# Patient Record
Sex: FEMALE | Race: ASIAN | NOT HISPANIC OR LATINO | Employment: FULL TIME | URBAN - METROPOLITAN AREA
[De-identification: names, ages, dates, MRNs, and addresses within clinical notes are randomized per-mention and may not be internally consistent; named-entity substitution may affect disease eponyms.]

---

## 2023-10-02 ENCOUNTER — OFFICE VISIT (OUTPATIENT)
Dept: PRIMARY CARE | Facility: CLINIC | Age: 53
End: 2023-10-02
Payer: COMMERCIAL

## 2023-10-02 VITALS
WEIGHT: 181 LBS | SYSTOLIC BLOOD PRESSURE: 130 MMHG | HEART RATE: 76 BPM | RESPIRATION RATE: 14 BRPM | DIASTOLIC BLOOD PRESSURE: 76 MMHG | BODY MASS INDEX: 28.41 KG/M2 | HEIGHT: 67 IN

## 2023-10-02 DIAGNOSIS — Z12.11 ENCOUNTER FOR SCREENING FOR MALIGNANT NEOPLASM OF COLON: ICD-10-CM

## 2023-10-02 DIAGNOSIS — D21.9 FIBROIDS: ICD-10-CM

## 2023-10-02 DIAGNOSIS — Z12.31 BREAST CANCER SCREENING BY MAMMOGRAM: ICD-10-CM

## 2023-10-02 DIAGNOSIS — Z00.00 ROUTINE MEDICAL EXAM: Primary | ICD-10-CM

## 2023-10-02 PROCEDURE — 99396 PREV VISIT EST AGE 40-64: CPT | Performed by: FAMILY MEDICINE

## 2023-10-02 PROCEDURE — 1036F TOBACCO NON-USER: CPT | Performed by: FAMILY MEDICINE

## 2023-10-02 RX ORDER — IBUPROFEN 600 MG/1
TABLET ORAL 4 TIMES DAILY
COMMUNITY
Start: 2021-12-06 | End: 2024-01-29 | Stop reason: WASHOUT

## 2023-10-02 NOTE — PROGRESS NOTES
Rt knee pain and swelling.  pt has regular dental visits. no vision problems. no hearing loss.   Lifestyle: healthy diet. no weight concerns. Pt exercises regularly.   no tobacco or alcohol abuse.   Safety elements used: seat belt, safe driving habits and smoke detector.   no passive smoke exposure, chemical abuse, domestic violence, anxiety symptoms, depression symptoms, pt has safe sexual behavior and safe driving habits, no driving violations, no history of DUI. no tuberculosis exposure.   The patient is postmenopausal. No vaginal bleeding. no menopausal symptoms. no bladder concerns.  no history of an abnormal pap smear.      Review of Systems  Constitutional: no chills, no fever and no night sweats.   Eyes: no blurred vision and no eyesight problems.   ENT: no hearing loss, no nasal congestion, no nasal discharge, no hoarseness and no sore throat.   Neck: no mass(es) and no swelling.   Cardiovascular: no chest pain, no intermittent leg claudication, no lower extremity edema, no palpitations and no syncope.   Respiratory: no cough, no shortness of breath during exertion, no shortness of breath at rest and no wheezing.   Gastrointestinal: no abdominal pain, no blood in stools, no constipation, no diarrhea, no melena, no nausea, no rectal pain and no vomiting.   Genitourinary: no unexplained vaginal bleeding, no dysuria, no change in urinary frequency, no genital lesions, no hematuria, no urinary hesitancy, no incontinence, no pelvic pain, no sexual dysfunction, no feelings of urinary urgency and no vaginal discharge.   Musculoskeletal: no arthralgias, no back pain, no localized joint pain, no myalgias and no neck pain.   Integumentary: no new skin lesions, no rashes and no skin wound.   Neurological: no confusion, no convulsions, no difficulty walking, no dizziness, no headache, no limb weakness, no memory changes, no numbness, no speech difficulties and no tingling.   Psychiatric: no anxiety, no personality  change, no depression, no anhedonia, no sleep disturbances and no substance use disorders.   Endocrine: no changes in appetite, no deepening of the voice, no polyuria, no feelings of weakness, no recent weight gain and no recent weight loss.   Hematologic/Lymphatic: no tendency for easy bleeding, no tendency for easy bruising, no recurrent infections and no swollen glands.     Physical Exam  Constitutional: Alert and in no acute distress. Well developed, well nourished.   Head and Face: Head and face: Normal.  Palpation of the face and sinuses: Normal.    Eyes: Normal external exam. Pupils: PERRLA,  EOMI.    Ears, Nose, Mouth, and Throat: External inspection of ears and nose: Normal.  Hearing: Normal.  Nasal mucosa, septum, and turbinates: Normal.  Oropharynx: Normal.    Neck: No neck mass was observed. Supple. Thyroid not enlarged and there were no palpable thyroid nodules.   Cardiovascular: Heart rate and rhythm were normal, normal S1 and S2, no gallops, no murmurs and no pericardial rub. Pedal pulses: Normal. No peripheral edema.   Pulmonary: No respiratory distress. Clear bilateral breath sounds.   Chest wall: Normal.    Abdomen: Soft nontender; no abdominal mass palpated. No organomegaly. No hernias.   Musculoskeletal: Gait and station: Normal. No joint swelling seen, normal movements of all extremities. Range of motion: Normal.  Muscle strength/tone: Normal.    Skin: Normal skin color and pigmentation, normal skin turgor, and no rash. Palpation of skin and subcutaneous tissue: Normal.    Neurologic: Cranial nerves 2-12 grossly intact. Deep tendon reflexes were 2+ and symmetric. Sensation: Normal. Coordination: Normal.    Psychiatric: Judgment and insight: Intact. Alert and oriented x 3. Recent and remote memory: Normal.  Mood and affect: Normal.   Lymphatic: No cervical lymphadenopathy. Palpation of lymph nodes in axillae: Normal.      Unremarkable PE except overweight. recommend nutritionist eval. Recommend  DASH diet and regular exercise. check CBC, CMP, lipid, TSH.  Advise eye exam by an OD yearly and dental exam every 6 months. will monitor lipid and weight yearly. Recommend sunscreen application if exposed to the sun when UV index is above 2.  recommend Hep C, HepB, HIV test. recommend   shingle, hepB, flu, covid shot.   Check  cologuard.   We will call pt regarding lab results. f/u as scheduled.  Recommend mammogram and pap smear. Pt prefers to see her Gynecologist for evaluation.

## 2023-10-04 ENCOUNTER — LAB (OUTPATIENT)
Dept: LAB | Facility: LAB | Age: 53
End: 2023-10-04
Payer: COMMERCIAL

## 2023-10-04 DIAGNOSIS — Z00.00 ROUTINE MEDICAL EXAM: ICD-10-CM

## 2023-10-04 LAB
ALBUMIN SERPL BCP-MCNC: 4.2 G/DL (ref 3.4–5)
ALP SERPL-CCNC: 58 U/L (ref 33–110)
ALT SERPL W P-5'-P-CCNC: 11 U/L (ref 7–45)
ANION GAP SERPL CALC-SCNC: 9 MMOL/L (ref 10–20)
AST SERPL W P-5'-P-CCNC: 20 U/L (ref 9–39)
BILIRUB SERPL-MCNC: 0.6 MG/DL (ref 0–1.2)
BUN SERPL-MCNC: 12 MG/DL (ref 6–23)
CALCIUM SERPL-MCNC: 9.5 MG/DL (ref 8.6–10.3)
CHLORIDE SERPL-SCNC: 108 MMOL/L (ref 98–107)
CHOLEST SERPL-MCNC: 199 MG/DL (ref 0–199)
CHOLESTEROL/HDL RATIO: 3.2
CO2 SERPL-SCNC: 26 MMOL/L (ref 21–32)
CREAT SERPL-MCNC: 0.78 MG/DL (ref 0.5–1.05)
ERYTHROCYTE [DISTWIDTH] IN BLOOD BY AUTOMATED COUNT: 12.1 % (ref 11.5–14.5)
GFR SERPL CREATININE-BSD FRML MDRD: >90 ML/MIN/1.73M*2
GLUCOSE SERPL-MCNC: 91 MG/DL (ref 74–99)
HCT VFR BLD AUTO: 41.2 % (ref 36–46)
HDLC SERPL-MCNC: 61.6 MG/DL
HGB BLD-MCNC: 13.1 G/DL (ref 12–16)
LDLC SERPL CALC-MCNC: 120 MG/DL (ref 140–190)
MCH RBC QN AUTO: 29.9 PG (ref 26–34)
MCHC RBC AUTO-ENTMCNC: 31.8 G/DL (ref 32–36)
MCV RBC AUTO: 94 FL (ref 80–100)
NON HDL CHOLESTEROL: 137 MG/DL (ref 0–149)
NRBC BLD-RTO: 0 /100 WBCS (ref 0–0)
PLATELET # BLD AUTO: 283 X10*3/UL (ref 150–450)
PMV BLD AUTO: 11 FL (ref 7.5–11.5)
POTASSIUM SERPL-SCNC: 4 MMOL/L (ref 3.5–5.3)
PROT SERPL-MCNC: 6.9 G/DL (ref 6.4–8.2)
RBC # BLD AUTO: 4.38 X10*6/UL (ref 4–5.2)
SODIUM SERPL-SCNC: 139 MMOL/L (ref 136–145)
TRIGL SERPL-MCNC: 85 MG/DL (ref 0–149)
TSH SERPL-ACNC: 3.44 MIU/L (ref 0.44–3.98)
VIT B12 SERPL-MCNC: 501 PG/ML (ref 211–911)
VLDL: 17 MG/DL (ref 0–40)
WBC # BLD AUTO: 4.9 X10*3/UL (ref 4.4–11.3)

## 2023-10-04 PROCEDURE — 36415 COLL VENOUS BLD VENIPUNCTURE: CPT

## 2023-10-06 ENCOUNTER — APPOINTMENT (OUTPATIENT)
Dept: RADIOLOGY | Facility: CLINIC | Age: 53
End: 2023-10-06
Payer: COMMERCIAL

## 2023-10-20 ENCOUNTER — APPOINTMENT (OUTPATIENT)
Dept: RADIOLOGY | Facility: CLINIC | Age: 53
End: 2023-10-20
Payer: COMMERCIAL

## 2023-10-20 ENCOUNTER — HOSPITAL ENCOUNTER (OUTPATIENT)
Dept: RADIOLOGY | Facility: HOSPITAL | Age: 53
Discharge: HOME | End: 2023-10-20
Payer: COMMERCIAL

## 2023-10-20 DIAGNOSIS — Z12.31 BREAST CANCER SCREENING BY MAMMOGRAM: ICD-10-CM

## 2023-10-20 PROCEDURE — 77063 BREAST TOMOSYNTHESIS BI: CPT | Mod: BILATERAL PROCEDURE | Performed by: RADIOLOGY

## 2023-10-20 PROCEDURE — 77067 SCR MAMMO BI INCL CAD: CPT

## 2023-10-20 PROCEDURE — 77067 SCR MAMMO BI INCL CAD: CPT | Mod: BILATERAL PROCEDURE | Performed by: RADIOLOGY

## 2023-10-20 PROCEDURE — 77063 BREAST TOMOSYNTHESIS BI: CPT | Mod: 50

## 2023-11-07 ENCOUNTER — OFFICE VISIT (OUTPATIENT)
Dept: PRIMARY CARE | Facility: CLINIC | Age: 53
End: 2023-11-07
Payer: COMMERCIAL

## 2023-11-07 VITALS — HEART RATE: 76 BPM | SYSTOLIC BLOOD PRESSURE: 95 MMHG | DIASTOLIC BLOOD PRESSURE: 60 MMHG | RESPIRATION RATE: 14 BRPM

## 2023-11-07 DIAGNOSIS — R55 PRE-SYNCOPE: Primary | ICD-10-CM

## 2023-11-07 PROCEDURE — 99213 OFFICE O/P EST LOW 20 MIN: CPT | Performed by: FAMILY MEDICINE

## 2023-11-07 PROCEDURE — 1036F TOBACCO NON-USER: CPT | Performed by: FAMILY MEDICINE

## 2023-11-07 NOTE — PROGRESS NOTES
Subjective   Patient ID: Starla Celaya is a 53 y.o. female who presents for almost passed out last Saturday    HPI   Pt almost passed out 3 days ago at home. No blurry vision,  CP, HA, dizziness, heart palpitation. No claudication or cold LE.  No LE edema. No incontinence, tongue biting or memory loss. No body shaking. Good mood. symptoms has not recurred since 3 days ago    Review of Systems    Objective   BP 95/60   Pulse 76   Resp 14     Physical Exam  NAD, well groomed, No sclera icterus. neck: supple, no cervical or axillary lymphadenopathy,  lungs: CTA b/l, heart: RRR, No LE edema, normal pedal pulses, abd: soft, no tenderness, BS+, normal strength and sensation  at bilateral lower extremities. Fair  balance. CNII-XII were grossly intact, good judgment and memory. No depressed mood.    Assessment/Plan     Pre syncope. Concerning of neuro and cardio etiology. Pt prefers to see a cardiologist for eval, previous neuro eval was unremarkable. Call or go to ER asp if symptoms recur

## 2023-12-05 ENCOUNTER — APPOINTMENT (OUTPATIENT)
Dept: CARDIOLOGY | Facility: CLINIC | Age: 53
End: 2023-12-05
Payer: COMMERCIAL

## 2024-01-29 ENCOUNTER — CONSULT (OUTPATIENT)
Dept: OBSTETRICS AND GYNECOLOGY | Facility: CLINIC | Age: 54
End: 2024-01-29
Payer: COMMERCIAL

## 2024-01-29 VITALS
BODY MASS INDEX: 27.63 KG/M2 | DIASTOLIC BLOOD PRESSURE: 74 MMHG | HEART RATE: 83 BPM | SYSTOLIC BLOOD PRESSURE: 134 MMHG | WEIGHT: 176.4 LBS

## 2024-01-29 DIAGNOSIS — Z12.31 BREAST CANCER SCREENING BY MAMMOGRAM: ICD-10-CM

## 2024-01-29 DIAGNOSIS — D25.1 INTRAMURAL AND SUBMUCOUS LEIOMYOMA OF UTERUS: ICD-10-CM

## 2024-01-29 DIAGNOSIS — D25.0 INTRAMURAL AND SUBMUCOUS LEIOMYOMA OF UTERUS: ICD-10-CM

## 2024-01-29 DIAGNOSIS — D21.9 FIBROIDS: Primary | ICD-10-CM

## 2024-01-29 PROCEDURE — 99204 OFFICE O/P NEW MOD 45 MIN: CPT | Performed by: OBSTETRICS & GYNECOLOGY

## 2024-01-29 PROCEDURE — 99214 OFFICE O/P EST MOD 30 MIN: CPT | Mod: TH | Performed by: OBSTETRICS & GYNECOLOGY

## 2024-01-29 ASSESSMENT — ENCOUNTER SYMPTOMS
RESPIRATORY NEGATIVE: 0
HEMATOLOGIC/LYMPHATIC NEGATIVE: 0
NEUROLOGICAL NEGATIVE: 0
MUSCULOSKELETAL NEGATIVE: 0
CARDIOVASCULAR NEGATIVE: 0
ALLERGIC/IMMUNOLOGIC NEGATIVE: 0
EYES NEGATIVE: 0
PSYCHIATRIC NEGATIVE: 0
CONSTITUTIONAL NEGATIVE: 0
ENDOCRINE NEGATIVE: 0
GASTROINTESTINAL NEGATIVE: 0

## 2024-01-29 ASSESSMENT — PAIN SCALES - GENERAL: PAINLEVEL: 0-NO PAIN

## 2024-01-29 NOTE — PROGRESS NOTES
"Assessment   IMPRESSIONS:  Known fibroid uterus with improving sx since HIFU in June 2023 in [facility].  Recommend repeat imaging for surveillance.  At this time, no concerning sx warranting hysterectomy.  Will give pt follow up plan following ultrasound.  Anticipate 6 month FU.    Aziza Rasmussen MD    Subjective   Cheryl Celaya \"Starla\" is a 53 y.o. female here for eval and mgmt of uterine fibroids. Has been seeing Dr. Rios.    Last note Oct 2022:  H/o multiple uterine fibroids, with enlarging uterus, at 11.5 cm now as compared to 9 cm on previous ultrasound.   Complex left ovarian cyst still persists, appears to be stable with normal further enlargement.    US Sept 2022:  \"Uterine position is anteverted, anteflexed. The uterus measures 11.5  x 10.9 x 7.2 cm. Multiple uterine masses are present, somewhat  difficult to define, consistent with fibroids. A posterior uterine  body partially subserosal partially intramural fibroid mass is 4.6 x  3.6 x 5.4 cm, previously measured in a slightly different manner,  approximate size on prior study 3.5 x 4.2 by 3.8 cm. A lower uterine  segment hypoechoic solid mass measuring 6.2 x 7.1 x 5.4 cm was  measured in a different manner on the prior study, difficult to  compare size although subjectively not significantly changed.  Anterior fundal fibroid measured on the prior study is not measured  on the current exam although is proximally 6.9 cm greatest diameter  measured on cine clip, similar to previous. The endometrium is poorly seen due to fibroids.\"    US Aug 2023 (in [facility]): Uterus 11.1 x 8 x 9 cm. Uterine fibroids reduced in size.    PATH Oct 2022: benign polyp    SX:  Pt reports she had HIFU in June 2023 in [facility], which shrunk down the fibroids a little bit. Due for repeat imaging.  Sx are minimal, occ pelvic discomfort, but no real bulk sx.  Pt has had decreasing bleeding over time, last any bleeding was Nov/Dec 2023, but bleed prior to that was in June 2023, and " prior to that was July 2022.   In terms of perimenopause, her VSM has been improving over the past year.  Last Hgb 13 in Oct 2023.    OBHx: G0    GynHx:  Menses: in late perimenopause  Pap: 2021 - neg cotest  STIs: denies  Sexual Activity: men, monogamous, no complaints  Contraception: None    Past med hx and past surg hx reviewed and notable for: orthostatic syncope    Objective   /74   Pulse 83   Wt 80 kg (176 lb 6.4 oz)   BMI 27.63 kg/m²      General:   Alert and oriented, in no acute distress   Neck: Supple. No visible thyromegaly.    Breast/Axilla: Normal to palpation bilaterally without masses, skin changes, or nipple discharge.    Abdomen: Soft, non-tender, enlarged uterus palpable below fundus on abd exam   Vulva: Normal architecture without erythema, masses, or lesions.    Vagina: Normal mucosa without lesions, masses, or atrophy. No abnormal vaginal discharge.    Cervix: Normal without masses, lesions, or signs of cervicitis.    Uterus: Non-mobile, bulky uterus.     Adnexa: Normal without masses or lesions   Pelvic Floor No POP noted. No high tone pelvic floor    Psych Normal affect. Normal mood.

## 2024-02-12 ENCOUNTER — HOSPITAL ENCOUNTER (OUTPATIENT)
Dept: RADIOLOGY | Facility: CLINIC | Age: 54
Discharge: HOME | End: 2024-02-12
Payer: COMMERCIAL

## 2024-02-12 DIAGNOSIS — D25.1 INTRAMURAL AND SUBMUCOUS LEIOMYOMA OF UTERUS: ICD-10-CM

## 2024-02-12 DIAGNOSIS — D25.0 INTRAMURAL AND SUBMUCOUS LEIOMYOMA OF UTERUS: ICD-10-CM

## 2024-02-12 DIAGNOSIS — D21.9 FIBROIDS: ICD-10-CM

## 2024-02-12 PROCEDURE — 76856 US EXAM PELVIC COMPLETE: CPT

## 2024-02-12 PROCEDURE — 76857 US EXAM PELVIC LIMITED: CPT | Performed by: RADIOLOGY

## 2024-05-13 ENCOUNTER — OFFICE VISIT (OUTPATIENT)
Dept: PRIMARY CARE | Facility: CLINIC | Age: 54
End: 2024-05-13
Payer: COMMERCIAL

## 2024-05-13 ENCOUNTER — LAB (OUTPATIENT)
Dept: LAB | Facility: LAB | Age: 54
End: 2024-05-13
Payer: COMMERCIAL

## 2024-05-13 VITALS
DIASTOLIC BLOOD PRESSURE: 67 MMHG | SYSTOLIC BLOOD PRESSURE: 123 MMHG | HEART RATE: 68 BPM | RESPIRATION RATE: 14 BRPM | BODY MASS INDEX: 27.62 KG/M2 | WEIGHT: 176 LBS | HEIGHT: 67 IN

## 2024-05-13 DIAGNOSIS — Z00.00 ROUTINE MEDICAL EXAM: Primary | ICD-10-CM

## 2024-05-13 DIAGNOSIS — Z00.00 ROUTINE MEDICAL EXAM: ICD-10-CM

## 2024-05-13 LAB
ALBUMIN SERPL BCP-MCNC: 4.5 G/DL (ref 3.4–5)
ALP SERPL-CCNC: 62 U/L (ref 33–110)
ALT SERPL W P-5'-P-CCNC: 14 U/L (ref 7–45)
ANION GAP SERPL CALC-SCNC: 11 MMOL/L (ref 10–20)
AST SERPL W P-5'-P-CCNC: 21 U/L (ref 9–39)
BILIRUB SERPL-MCNC: 0.4 MG/DL (ref 0–1.2)
BUN SERPL-MCNC: 12 MG/DL (ref 6–23)
CALCIUM SERPL-MCNC: 10.3 MG/DL (ref 8.6–10.3)
CHLORIDE SERPL-SCNC: 106 MMOL/L (ref 98–107)
CHOLEST SERPL-MCNC: 196 MG/DL (ref 0–199)
CHOLESTEROL/HDL RATIO: 3.1
CO2 SERPL-SCNC: 27 MMOL/L (ref 21–32)
CREAT SERPL-MCNC: 0.7 MG/DL (ref 0.5–1.05)
EGFRCR SERPLBLD CKD-EPI 2021: >90 ML/MIN/1.73M*2
ERYTHROCYTE [DISTWIDTH] IN BLOOD BY AUTOMATED COUNT: 12.4 % (ref 11.5–14.5)
GLUCOSE SERPL-MCNC: 85 MG/DL (ref 74–99)
HCT VFR BLD AUTO: 44.1 % (ref 36–46)
HDLC SERPL-MCNC: 62.5 MG/DL
HGB BLD-MCNC: 14.1 G/DL (ref 12–16)
LDLC SERPL CALC-MCNC: 114 MG/DL
MCH RBC QN AUTO: 30.3 PG (ref 26–34)
MCHC RBC AUTO-ENTMCNC: 32 G/DL (ref 32–36)
MCV RBC AUTO: 95 FL (ref 80–100)
NON HDL CHOLESTEROL: 134 MG/DL (ref 0–149)
NRBC BLD-RTO: 0 /100 WBCS (ref 0–0)
PLATELET # BLD AUTO: 236 X10*3/UL (ref 150–450)
POTASSIUM SERPL-SCNC: 4 MMOL/L (ref 3.5–5.3)
PROT SERPL-MCNC: 7.7 G/DL (ref 6.4–8.2)
RBC # BLD AUTO: 4.66 X10*6/UL (ref 4–5.2)
SODIUM SERPL-SCNC: 140 MMOL/L (ref 136–145)
T4 FREE SERPL-MCNC: 0.75 NG/DL (ref 0.61–1.12)
TRIGL SERPL-MCNC: 99 MG/DL (ref 0–149)
TSH SERPL-ACNC: 6.09 MIU/L (ref 0.44–3.98)
VLDL: 20 MG/DL (ref 0–40)
WBC # BLD AUTO: 4.1 X10*3/UL (ref 4.4–11.3)

## 2024-05-13 PROCEDURE — 84439 ASSAY OF FREE THYROXINE: CPT

## 2024-05-13 PROCEDURE — 1036F TOBACCO NON-USER: CPT | Performed by: FAMILY MEDICINE

## 2024-05-13 PROCEDURE — 85027 COMPLETE CBC AUTOMATED: CPT

## 2024-05-13 PROCEDURE — 99396 PREV VISIT EST AGE 40-64: CPT | Performed by: FAMILY MEDICINE

## 2024-05-13 PROCEDURE — 80053 COMPREHEN METABOLIC PANEL: CPT

## 2024-05-13 PROCEDURE — 80061 LIPID PANEL: CPT

## 2024-05-13 PROCEDURE — 84443 ASSAY THYROID STIM HORMONE: CPT

## 2024-05-13 PROCEDURE — 36415 COLL VENOUS BLD VENIPUNCTURE: CPT

## 2024-05-13 NOTE — PROGRESS NOTES
No concerns today. pt has regular dental visits. no vision problems. no hearing loss.   Lifestyle: healthy diet. + weight concerns. Pt exercises regularly.   no tobacco or alcohol abuse.   Safety elements used: seat belt, safe driving habits and smoke detector.   no passive smoke exposure, chemical abuse, domestic violence, anxiety symptoms, depression symptoms, safe driving habits, no driving violations, no history of DUI. no tuberculosis exposure.   The patient is postmenopausal. No vaginal bleeding. no menopausal symptoms. no bladder concerns.  no history of an abnormal pap smear.      Review of Systems  Constitutional: no chills, no fever and no night sweats.   Eyes: no blurred vision and no eyesight problems.   ENT: no hearing loss, no nasal congestion, no nasal discharge, no hoarseness and no sore throat.   Neck: no mass(es) and no swelling.   Cardiovascular: no chest pain, no intermittent leg claudication, no lower extremity edema, no palpitations and no syncope.   Respiratory: no cough, no shortness of breath during exertion, no shortness of breath at rest and no wheezing.   Gastrointestinal: no abdominal pain, no blood in stools, no constipation, no diarrhea, no melena, no nausea, no rectal pain and no vomiting.   Genitourinary: no unexplained vaginal bleeding, no dysuria, no change in urinary frequency, no genital lesions, no hematuria, no urinary hesitancy, no incontinence, no pelvic pain, no sexual dysfunction, no feelings of urinary urgency and no vaginal discharge.   Musculoskeletal: mild rt knee arthralgias, no back pain, no localized joint pain, no myalgias and no neck pain.   Integumentary: no new skin lesions, no rashes and no skin wound.   Neurological: no confusion, no convulsions, no difficulty walking, no dizziness, no headache, no limb weakness, no memory changes, no numbness, no speech difficulties and no tingling.   Psychiatric: no anxiety, no personality change, no depression, no anhedonia,  no sleep disturbances and no substance use disorders.   Endocrine: no changes in appetite, no deepening of the voice, no polyuria, no feelings of weakness, no recent weight gain and no recent weight loss.   Hematologic/Lymphatic: no tendency for easy bleeding, no tendency for easy bruising, no recurrent infections and no swollen glands.     Physical Exam  Constitutional: Alert and in no acute distress. Well developed, well nourished.   Head and Face: Head and face: Normal.  Palpation of the face and sinuses: Normal.    Eyes: Normal external exam. Pupils: PERRLA,  EOMI.    Ears, Nose, Mouth, and Throat: External inspection of ears and nose: Normal.  Hearing: Normal.  Nasal mucosa, septum, and turbinates: Normal.  Oropharynx: Normal.    Neck: No neck mass was observed. Supple. Thyroid not enlarged and there were no palpable thyroid nodules.   Cardiovascular: Heart rate and rhythm were normal, normal S1 and S2, no gallops, no murmurs and no pericardial rub. Pedal pulses: Normal. No peripheral edema.   Pulmonary: No respiratory distress. Clear bilateral breath sounds.   Chest wall: Normal.    Abdomen: Soft nontender; no abdominal mass palpated. No organomegaly. No hernias.   Musculoskeletal: Gait and station: Normal. No joint swelling seen, normal movements of all extremities. Range of motion: Normal.  Muscle strength/tone: Normal.  mild rt knee tenderness,   Skin: Normal skin color and pigmentation, normal skin turgor, and no rash. Palpation of skin and subcutaneous tissue: Normal.    Neurologic: Cranial nerves 2-12 grossly intact. Deep tendon reflexes were 2+ and symmetric. Sensation: Normal. Coordination: Normal.    Psychiatric: Judgment and insight: Intact. Alert and oriented x 3. Recent and remote memory: Normal.  Mood and affect: Normal.   Lymphatic: No cervical lymphadenopathy. Palpation of lymph nodes in axillae: Normal.      Unremarkable PE except overweight. Recommend DASH diet and regular exercise. check CBC,  CMP, lipid, TSH.  Advise eye exam by an OD yearly and dental exam every 6 months. will monitor lipid and weight yearly. Recommend sunscreen application if exposed to the sun when UV index is above 2.  recommend Hep C, HepB, HIV test. recommend   shingle shot.  recommend colonoscopy,  Check cologuard.   We will call pt regarding lab results. f/u as scheduled.

## 2024-08-05 ENCOUNTER — APPOINTMENT (OUTPATIENT)
Dept: OBSTETRICS AND GYNECOLOGY | Facility: CLINIC | Age: 54
End: 2024-08-05
Payer: COMMERCIAL